# Patient Record
Sex: FEMALE | Race: OTHER | HISPANIC OR LATINO | URBAN - METROPOLITAN AREA
[De-identification: names, ages, dates, MRNs, and addresses within clinical notes are randomized per-mention and may not be internally consistent; named-entity substitution may affect disease eponyms.]

---

## 2018-01-20 ENCOUNTER — EMERGENCY (EMERGENCY)
Facility: HOSPITAL | Age: 53
LOS: 1 days | Discharge: HOME | End: 2018-01-20
Payer: SELF-PAY

## 2018-01-20 VITALS
HEART RATE: 91 BPM | SYSTOLIC BLOOD PRESSURE: 142 MMHG | RESPIRATION RATE: 16 BRPM | WEIGHT: 132.28 LBS | TEMPERATURE: 98 F | DIASTOLIC BLOOD PRESSURE: 79 MMHG | HEIGHT: 57.09 IN | OXYGEN SATURATION: 98 %

## 2018-01-20 PROCEDURE — 93971 EXTREMITY STUDY: CPT

## 2018-01-20 PROCEDURE — 99284 EMERGENCY DEPT VISIT MOD MDM: CPT | Mod: 25

## 2018-01-20 PROCEDURE — 93971 EXTREMITY STUDY: CPT | Mod: 26,LT

## 2018-01-20 PROCEDURE — 99284 EMERGENCY DEPT VISIT MOD MDM: CPT

## 2018-01-20 NOTE — ED PROVIDER NOTE - PHYSICAL EXAMINATION
L posterior calf tenderness, no swelling/erythema  LLE: full ROM, no bony tenderness  steady unassisted gait

## 2018-01-20 NOTE — ED PROVIDER NOTE - MEDICAL DECISION MAKING DETAILS
pt well appearing, vss afebrile, doppler negative for L calf pain, sx suggestive msk strain, will give IBU instructed to use heat and ortho follow up as needed. Steady gait at time of discharge. No signs infection.

## 2018-01-20 NOTE — ED PROVIDER NOTE - OBJECTIVE STATEMENT
51 y/o female, no significant pmhx c/o worsening L calf pain x3 days. Denies falls, h/o DVT or any other concerns. Daily smoker. No interventions prior to arrival.
